# Patient Record
Sex: FEMALE | URBAN - METROPOLITAN AREA
[De-identification: names, ages, dates, MRNs, and addresses within clinical notes are randomized per-mention and may not be internally consistent; named-entity substitution may affect disease eponyms.]

---

## 2022-04-04 ENCOUNTER — OFFICE VISIT (OUTPATIENT)
Dept: URBAN - METROPOLITAN AREA CLINIC 82 | Facility: CLINIC | Age: 87
End: 2022-04-04
Payer: MEDICARE

## 2022-04-04 DIAGNOSIS — H04.123 DRY EYE SYNDROME OF BILATERAL LACRIMAL GLANDS: Primary | ICD-10-CM

## 2022-04-04 DIAGNOSIS — Z96.1 PRESENCE OF INTRAOCULAR LENS: ICD-10-CM

## 2022-04-04 DIAGNOSIS — H35.361 DRUSEN (DEGENERATIVE) OF MACULA, RIGHT EYE: ICD-10-CM

## 2022-04-04 PROCEDURE — 99203 OFFICE O/P NEW LOW 30 MIN: CPT | Performed by: OPTOMETRIST

## 2022-04-04 PROCEDURE — 92015 DETERMINE REFRACTIVE STATE: CPT | Performed by: OPTOMETRIST

## 2022-04-04 PROCEDURE — 92134 CPTRZ OPH DX IMG PST SGM RTA: CPT | Performed by: OPTOMETRIST

## 2022-04-04 ASSESSMENT — INTRAOCULAR PRESSURE
OS: 13
OD: 13

## 2022-04-04 ASSESSMENT — KERATOMETRY
OS: 44.38
OD: 42.75

## 2022-04-04 ASSESSMENT — VISUAL ACUITY
OD: 20/60
OS: 20/60

## 2022-04-04 NOTE — IMPRESSION/PLAN
Impression: Dry eye syndrome of bilateral lacrimal glands: H04.123. Plan: Educated pt on exam findings. Educated pt on chronic nature of condition. Recommended pt begin regularly using ATs- recommended Systane Complete 4x/day both eyes. Educated pt fluctuating vision is likely related to dryness. Educated pt her eyes appear to be severely dry and limited visual improvements noted with refraction today. Recommended treating the dryness aggressively for the next month, and follow up to recheck refraction and consider updating glasses at that time.

## 2022-04-04 NOTE — IMPRESSION/PLAN
Impression: Drusen (degenerative) of macula, right eye: H35.361. Plan: Will continue to monitor vision and the patient has been instructed to call with any vision changes. Recommended diet of green leafy vegetables and sunglasses while outside. Discussed AREDS II recommendations and studies showing potential slowing of progression. Consult with PCP to make sure there is no contraindication to AREDS II formulation use from their perspective. Monitor 1 year.